# Patient Record
Sex: FEMALE | Employment: UNEMPLOYED | URBAN - METROPOLITAN AREA
[De-identification: names, ages, dates, MRNs, and addresses within clinical notes are randomized per-mention and may not be internally consistent; named-entity substitution may affect disease eponyms.]

---

## 2019-05-01 ENCOUNTER — TELEPHONE (OUTPATIENT)
Dept: HEMATOLOGY/ONCOLOGY | Facility: HOSPITAL | Age: 44
End: 2019-05-01

## 2019-05-02 NOTE — TELEPHONE ENCOUNTER
Spoke with Evelyn Murray, navigator at [facility]. 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  Her 2 is pending on the R. Pt have MRI on 4/30, with a suspicious area noted on the L, for which she underwent biopsy on 5/1

## 2019-05-07 ENCOUNTER — OFFICE VISIT (OUTPATIENT)
Dept: HEMATOLOGY/ONCOLOGY | Facility: HOSPITAL | Age: 44
End: 2019-05-07
Attending: INTERNAL MEDICINE
Payer: COMMERCIAL

## 2019-05-07 ENCOUNTER — OFFICE VISIT (OUTPATIENT)
Dept: SURGERY | Facility: CLINIC | Age: 44
End: 2019-05-07
Payer: COMMERCIAL

## 2019-05-07 ENCOUNTER — NURSE NAVIGATOR ENCOUNTER (OUTPATIENT)
Dept: HEMATOLOGY/ONCOLOGY | Facility: HOSPITAL | Age: 44
End: 2019-05-07

## 2019-05-07 ENCOUNTER — GENETICS ENCOUNTER (OUTPATIENT)
Dept: GENETICS | Facility: HOSPITAL | Age: 44
End: 2019-05-07
Attending: INTERNAL MEDICINE
Payer: COMMERCIAL

## 2019-05-07 VITALS
HEIGHT: 60 IN | OXYGEN SATURATION: 99 % | DIASTOLIC BLOOD PRESSURE: 89 MMHG | WEIGHT: 161 LBS | HEART RATE: 86 BPM | RESPIRATION RATE: 18 BRPM | TEMPERATURE: 99 F | BODY MASS INDEX: 31.61 KG/M2 | SYSTOLIC BLOOD PRESSURE: 162 MMHG

## 2019-05-07 DIAGNOSIS — Z17.0 MALIGNANT NEOPLASM OF UPPER-OUTER QUADRANT OF RIGHT BREAST IN FEMALE, ESTROGEN RECEPTOR POSITIVE (HCC): Primary | ICD-10-CM

## 2019-05-07 DIAGNOSIS — C50.411 MALIGNANT NEOPLASM OF UPPER-OUTER QUADRANT OF RIGHT BREAST IN FEMALE, ESTROGEN RECEPTOR POSITIVE (HCC): Primary | ICD-10-CM

## 2019-05-07 DIAGNOSIS — C50.411 MALIGNANT NEOPLASM OF UPPER-OUTER QUADRANT OF RIGHT BREAST IN FEMALE, ESTROGEN RECEPTOR POSITIVE (HCC): ICD-10-CM

## 2019-05-07 DIAGNOSIS — Z17.0 MALIGNANT NEOPLASM OF UPPER-OUTER QUADRANT OF RIGHT BREAST IN FEMALE, ESTROGEN RECEPTOR POSITIVE (HCC): ICD-10-CM

## 2019-05-07 PROCEDURE — 99215 OFFICE O/P EST HI 40 MIN: CPT | Performed by: INTERNAL MEDICINE

## 2019-05-07 PROCEDURE — 99211 OFF/OP EST MAY X REQ PHY/QHP: CPT

## 2019-05-07 PROCEDURE — 99245 OFF/OP CONSLTJ NEW/EST HI 55: CPT | Performed by: SURGERY

## 2019-05-07 NOTE — CONSULTS
Cancer Center Report of Consultation    Patient Name: Yamilka Martin   YOB: 1975   Medical Record Number: [id number]   CSN: [id number]   Consulting Physician: Shyla Banuelos MD  Referring Physician(s): No ref.  provider found  Date of Consulta breast cancer in her [de-identified]       Gyne History:  OB History    Comment: Menarche at age 15  First pregnancy at age 34  LMP at age 36 with uterine ablation procedure  OCP x 12 years (age 16 to 34)    Psychosocial History:  Social History    Socioeconomic Hist were no vitals taken for this visit. Physical Examination:    General: Patient is alert and oriented x 3, not in acute distress. Psych:  Her mood is calm and appropriate for this visit. She came with her . HEENT: EOMs intact. PERRL.  Oropharynx margins. We discussed the option of mastectomy. She will meet with genetic counseling about germline genetic testing. I discussed the issues concerning adjuvant therapy. She has favorable biologic features of her lobular cancer.  I discussed that we farooq

## 2019-05-07 NOTE — H&P
History provided by:patient and spouse  HPI:   HPI  The patient is a 41-year-old female seen at the request of her primary care physician regarding a new diagnosis of right breast invasive lobular carcinoma.   The patient underwent routine imaging in 2016, in her [de-identified]     Social History:  Social History    Tobacco Use      Smoking status: Never Smoker      Smokeless tobacco: Never Used    Alcohol use: Yes      Comment: 3 drinks per month    Drug use: Never    Allergies/Medications:    Allergies: No Known Parvez Right breast exhibits no inverted nipple, no mass, no nipple discharge, no skin change and no tenderness. Left breast exhibits no inverted nipple, no mass, no nipple discharge, no skin change and no tenderness.    Lymphadenopathy:        Head (right side): the patient regarding the diagnosis of breast cancer. We discussed the biology of breast cancer as well as the difference between in situ and invasive disease.     · We discussed the treatment options of breast cancer in regards to surgery, radiation, chem making final plans for surgery. · All of her questions were answered to her satisfaction. · I spent 50 minutes with the patient counseling her and coordinating her care. Over half of that time was spent on counseling and coordination of care.   · Preoper

## 2019-05-07 NOTE — PROGRESS NOTES
Met with patient and  in clinic. Introduced myself as the breast navigator nurse and explained my role and how I will work with all of the physicians involved in her care.  Explained the role of all of the physicians involved in her care including th

## 2019-05-07 NOTE — PROGRESS NOTES
Patient is here today for Consult  with Dr. Any Alvaraod Surgeon/ Dr. Ciaran Devine. Patient Denies pain. Medication list and medical history were reviewed and updated.     Education Record    Learner:  Patient and spouse    Disease / Diagnosis: B

## 2019-05-08 NOTE — PROGRESS NOTES
Referring Provider: Dr. Anneliese Moses    Reason for Referral:  Ms. Devika Arenas was referred for genetic counseling because of a new diagnosis of ILC of the right breast at age 40.       Ms. Ari Tan is a 40year-old woman of [facility] an ovarian cancer families. Mutations in genes other than BRCA1/2, many of which now have medical management recommendations (e.g., CHEK2, THU, RAD51C, RAD51D, PALB2), are identified in 3-10% of individuals tested using a multigene panel.        Risk Assessme was also explained that for some of the genes for which testing is available, the associated cancer risks have yet to be determined and medical management recommendations may not yet be available for individuals with pathogenic variants in these genes.  If

## 2019-05-10 ENCOUNTER — MED REC SCAN ONLY (OUTPATIENT)
Dept: HEMATOLOGY/ONCOLOGY | Facility: HOSPITAL | Age: 44
End: 2019-05-10

## 2019-05-14 ENCOUNTER — TELEPHONE (OUTPATIENT)
Dept: HEMATOLOGY/ONCOLOGY | Facility: HOSPITAL | Age: 44
End: 2019-05-14

## 2019-05-14 NOTE — TELEPHONE ENCOUNTER
Left voicemail for patient regarding plan of care. Contact information provided should she need any other assistance at this time.

## 2019-05-21 ENCOUNTER — GENETICS ENCOUNTER (OUTPATIENT)
Dept: HEMATOLOGY/ONCOLOGY | Facility: HOSPITAL | Age: 44
End: 2019-05-21

## 2019-05-21 NOTE — PROGRESS NOTES
Referring Provider: Dr. Harry Lee    Reason for Referral:  Ms. Maria Antonia Tyson had [facility] panel genetic testing performed on 05/07/2019 because of a personal history of breast cancer.      Genetic Testing Result:  No mutat

## 2019-05-22 ENCOUNTER — TELEPHONE (OUTPATIENT)
Dept: SURGERY | Facility: CLINIC | Age: 44
End: 2019-05-22

## 2019-05-22 NOTE — TELEPHONE ENCOUNTER
Called the patient to discuss her negative genetic testing results. She has decided to receive her care of [address] [place] [address]. I wished her luck with her treatment and recovery.

## 2024-04-29 ENCOUNTER — APPOINTMENT (OUTPATIENT)
Dept: URBAN - METROPOLITAN AREA CLINIC 247 | Age: 49
Setting detail: DERMATOLOGY
End: 2024-04-30

## 2024-04-29 DIAGNOSIS — D22 MELANOCYTIC NEVI: ICD-10-CM

## 2024-04-29 DIAGNOSIS — D18.0 HEMANGIOMA: ICD-10-CM

## 2024-04-29 DIAGNOSIS — L57.8 OTHER SKIN CHANGES DUE TO CHRONIC EXPOSURE TO NONIONIZING RADIATION: ICD-10-CM

## 2024-04-29 PROBLEM — D18.01 HEMANGIOMA OF SKIN AND SUBCUTANEOUS TISSUE: Status: ACTIVE | Noted: 2024-04-29

## 2024-04-29 PROBLEM — D22.62 MELANOCYTIC NEVI OF LEFT UPPER LIMB, INCLUDING SHOULDER: Status: ACTIVE | Noted: 2024-04-29

## 2024-04-29 PROBLEM — D22.9 MELANOCYTIC NEVI, UNSPECIFIED: Status: ACTIVE | Noted: 2024-04-29

## 2024-04-29 PROBLEM — D23.62 OTHER BENIGN NEOPLASM OF SKIN OF LEFT UPPER LIMB, INCLUDING SHOULDER: Status: ACTIVE | Noted: 2024-04-29

## 2024-04-29 PROCEDURE — OTHER MIPS QUALITY: OTHER

## 2024-04-29 PROCEDURE — OTHER DIAGNOSIS COMMENT: OTHER

## 2024-04-29 PROCEDURE — OTHER PHOTO-DOCUMENTATION: OTHER

## 2024-04-29 PROCEDURE — OTHER COUNSELING: OTHER

## 2024-04-29 PROCEDURE — 99203 OFFICE O/P NEW LOW 30 MIN: CPT

## 2024-04-29 ASSESSMENT — LOCATION ZONE DERM
LOCATION ZONE: ARM
LOCATION ZONE: NECK

## 2024-04-29 ASSESSMENT — LOCATION SIMPLE DESCRIPTION DERM
LOCATION SIMPLE: LEFT ANTERIOR NECK
LOCATION SIMPLE: LEFT UPPER ARM
LOCATION SIMPLE: RIGHT ANTERIOR NECK

## 2024-04-29 ASSESSMENT — LOCATION DETAILED DESCRIPTION DERM
LOCATION DETAILED: RIGHT INFERIOR ANTERIOR NECK
LOCATION DETAILED: LEFT ANTERIOR PROXIMAL UPPER ARM
LOCATION DETAILED: LEFT INFERIOR ANTERIOR NECK

## 2024-04-29 NOTE — PROCEDURE: DIAGNOSIS COMMENT
Render Risk Assessment In Note?: no
Detail Level: Simple
Comment: left lateral heel 0.5cm\\nMonitor at this time.\\nRTC in 3 months to recheck.

## (undated) NOTE — Clinical Note
Fiona,I don't see the Her2 in the reports on this patient. The patient says she was told it was negative. Can we make sure that we have this report.  Thanks, Марина Wan

## (undated) NOTE — LETTER
19    Patient: Barb Gordon  : 3/30/1975 Visit date: 2019    Dear  Dr. Wild Verde,    Thank you for referring Barb Gordon to my practice. Please find my assessment and plan below.       Assessment/Plan:  Invasive lobular carcinoma of the rig could undergo a successful lumpectomy with sentinel lymph node biopsy.   · I did caution the patient that if she has a gene that predisposes to breast cancer, I would then recommend not only a right breast mastectomy but also a prophylactic left breast mast